# Patient Record
(demographics unavailable — no encounter records)

---

## 2025-02-05 NOTE — HISTORY OF PRESENT ILLNESS
[Parents] : parents [Eats meals with family] : eats meals with family [Normal] : Normal [Brushing teeth twice/d] : brushing teeth twice per day [Premenarche] : premenarche [Playtime (60 min/d)] : playtime 60 min a day [Appropiate parent-child-sibling interaction] : appropriate parent-child-sibling interaction [Has Friends] : has friends [Adequate social interactions] : adequate social interactions [Adequate behavior] : adequate behavior [Adequate performance] : adequate performance [de-identified] : excellent student

## 2025-04-09 NOTE — CONSULT LETTER
[Dear  ___] : Dear  [unfilled], [Consult Letter:] : I had the pleasure of evaluating your patient, [unfilled]. [Please see my note below.] : Please see my note below. [Consult Closing:] : Thank you very much for allowing me to participate in the care of this patient.  If you have any questions, please do not hesitate to contact me. [Sincerely,] : Sincerely, [FreeTextEntry3] : Chay Vidales MD Department of Dermatology  [facility]

## 2025-04-09 NOTE — HISTORY OF PRESENT ILLNESS
[FreeTextEntry1] : npv; eczema  [de-identified] : JAQUAN KAYE is a pleasant 10 year F who presents for the following:  #eczema flaring, persistent, long standing history of eczema, pruritic and keeping her up at night, impacting quality of life  Currently using cetaphil and cerave cream based moisturizers and ointments  S: dove bar soap D: Organic fragrance free _________________  SH: Presents with mom and dad

## 2025-04-09 NOTE — ASSESSMENT
[FreeTextEntry1] : # Atopic dermatitis, moderate-severe, chronic, flaring Flared BSA >70%, upper and lower extremities, trunk, back  We have discussed the nature and course of this condition. We have discussed a skin care regimen inclusive of bathing and moisturizing routines. We have emphasized the importance of this regimen in improving the condition. We have discussed treatment options, expectations from treatments, and associated side effects of topical therapies." - Given extensive BSA and recalcitrant to topical steroids in the past including triamcinolone we discussed option for systemic therapy such as Dupixent. Shruthi and Joann are both open and interested in treatment with dupixent but would also like to retrial topical treatments as below, possibly increasing potency and have re-education on proper use. They would like to see how she responds to this first and then RTC for discussion of Dupixent initiation if needed.  - Can c/w triamcinolone 0.1% ointment BID to AAs on the body for up to 2 weeks on, then take 1 week off. SED including atrophy, dyspigmentation, telangiectasias, striae. Proper use reviewed including only using to affected area and avoidance of prolonged use. - For more persistent, recalcitrant spots can treat with clobetasol 0.05% ointment BID to the affected areas and as needed for itch and roughness.  - As maintenance and for face recommend tacrolimus 0.03% ointment BID  - rec minimal to no soap, no dryer sheets, no wool balls, no fabric softener  # Xerosis cutis - Dry skincare reviewed, handout provided Dry skin care reviewed:   Recommended cerave or Cetaphil healing ointment or plain Vaseline   RTC 6-8 weeks

## 2025-04-09 NOTE — HISTORY OF PRESENT ILLNESS
[FreeTextEntry1] : npv; eczema  [de-identified] : JAQUAN KAYE is a pleasant 10 year F who presents for the following:  #eczema flaring, persistent, long standing history of eczema, pruritic and keeping her up at night, impacting quality of life  Currently using cetaphil and cerave cream based moisturizers and ointments  S: dove bar soap D: Organic fragrance free _________________  SH: Presents with mom and dad

## 2025-04-09 NOTE — PHYSICAL EXAM
[FreeTextEntry3] : diffuse hyperpigmented scaly plaques with areas of overlying lichenification, excoriated papules, diffuse xerosis on all body surfaces including trunk, upper extremities and lower extremities

## 2025-04-09 NOTE — CONSULT LETTER
[Dear  ___] : Dear  [unfilled], [Consult Letter:] : I had the pleasure of evaluating your patient, [unfilled]. [Please see my note below.] : Please see my note below. [Consult Closing:] : Thank you very much for allowing me to participate in the care of this patient.  If you have any questions, please do not hesitate to contact me. [Sincerely,] : Sincerely, [FreeTextEntry3] : Chay Vidales MD Department of Dermatology  [facility]

## 2025-04-15 NOTE — HISTORY OF PRESENT ILLNESS
[FreeTextEntry6] : Patient Joann is here with her mother due to complaint of swelling of a possible lymph node located beneath her chin. Patients' mom states she has had this swelling since she was born and it was much smaller but now it feels bigger, not recently swollen but feels as though it has grown with her. She states she also can palpate a lymph node on the back R side of her neck. Patients' routine dentist found the swelling concerning and had her f/u with oral surgery who said it is not dental related and said to f/u with PCP. No discharge or bleeding from these swellings. No recent illness. Recent travel to [place] in February.   PMH: medical conditions: eczema meds: eczema ointment, triamcinolone, clobetasol  allergies: tacrolimus (used prior for eczema) surgeries: no surgeries  hospitalizations: none VUTD? yes, including  family history:  patients' brother has rare disease called JXG (juvenile xanthogranuloma - presents as nodules under skin, dx'd at 4 months old via biopsy)  patients' mother and maternal grandparents have hypothyroidism

## 2025-04-15 NOTE — HISTORY OF PRESENT ILLNESS
[FreeTextEntry6] : Patient Joann is here with her mother due to complaint of swelling of a possible lymph node located beneath her chin. Patients' mom states she has had this swelling since she was born and it was much smaller but now it feels bigger, not recently swollen but feels as though it has grown with her. She states she also can palpate a lymph node on the back R side of her neck. Patients' routine dentist found the swelling concerning and had her f/u with oral surgery who said it is not dental related and said to f/u with PCP. No discharge or bleeding from these swellings. No recent illness. Recent travel to [place] in February.   PMH: medical conditions: eczema meds: eczema ointment, triamcinolone, clobetasol  allergies: tacrolimus (used prior for eczema) surgeries: no surgeries  hospitalizations: none VUTD? yes, including  family history:  patients' brother has rare disease called JXG (juvenile xanthogranuloma - presents as nodules under skin, dx'd at 4 months old via biopsy)  patients' mother and maternal grandparents have hypothyroidism

## 2025-04-15 NOTE — DISCUSSION/SUMMARY
[FreeTextEntry1] : Joann is a 10y female no pmh presenting for chronic swelling of possible lymph node under her chin and behind the R side of her neck. Was referred by oral surgeon. In office today, swelling is normal, likely palpable lymph nodes appropriately placed. Reassurance provided to mother. Warning signs reviewed with mother. Routine follow up recommended.

## 2025-04-15 NOTE — HISTORY OF PRESENT ILLNESS
[FreeTextEntry6] : Patient Joann is here with her mother due to complaint of swelling of a possible lymph node located beneath her chin. Patients' mom states she has had this swelling since she was born and it was much smaller but now it feels bigger, not recently swollen but feels as though it has grown with her. She states she also can palpate a lymph node on the back R side of her neck. Patients' routine dentist found the swelling concerning and had her f/u with oral surgery who said it is not dental related and said to f/u with PCP. No discharge or bleeding from these swellings. No recent illness. Recent travel to [place] in February.   PMH: medical conditions: eczema meds: eczema ointment, triamcinolone, clobetasol  allergies: tacrolimus (used prior for eczema) surgeries: no surgeries  hospitalizations: none VUTD? yes, including  family history:  patients' brother has rare disease called JXG (juvenile xanthogranuloma - presents as nodules under skin, dx'd at 4 months old via biopsy)  patients' mother and maternal grandparents have hypothyroidism

## 2025-04-15 NOTE — HISTORY OF PRESENT ILLNESS
[FreeTextEntry6] : Patient Joann is here with her mother due to complaint of swelling of a possible lymph node located beneath her chin. Patients' mom states she has had this swelling since she was born and it was much smaller but now it feels bigger, not recently swollen but feels as though it has grown with her. She states she also can palpate a lymph node on the back R side of her neck. Patients' routine dentist found the swelling concerning and had her f/u with oral surgery who said it is not dental related and said to f/u with PCP. No discharge or bleeding from these swellings. No recent illness. Recent travel to [place] in February.   PMH: medical conditions: eczema meds: eczema ointment, triamcinolone, clobetasol  allergies: tacrolimus (used prior for eczema) surgeries: no surgeries  hospitalizations: none VUTD? yes, including  family history:  patients' brother has rare disease called JXG (juvenile xanthogranuloma - presents as nodules under skin, dx'd at 4 months old via biopsy)  patients' mother and maternal grandparents have hypothyroidism

## 2025-04-15 NOTE — END OF VISIT
[] : Resident [FreeTextEntry3] : Normal palpable LN reassurance provided [Time Spent: ___ minutes] : I have spent [unfilled] minutes of time on the encounter which excludes teaching and separately reported services.